# Patient Record
Sex: FEMALE | Race: WHITE | NOT HISPANIC OR LATINO | Employment: UNEMPLOYED | URBAN - METROPOLITAN AREA
[De-identification: names, ages, dates, MRNs, and addresses within clinical notes are randomized per-mention and may not be internally consistent; named-entity substitution may affect disease eponyms.]

---

## 2020-08-18 ENCOUNTER — APPOINTMENT (OUTPATIENT)
Dept: ULTRASOUND IMAGING | Facility: CLINIC | Age: 55
End: 2020-08-18
Attending: EMERGENCY MEDICINE
Payer: COMMERCIAL

## 2020-08-18 ENCOUNTER — NURSE TRIAGE (OUTPATIENT)
Dept: NURSING | Facility: CLINIC | Age: 55
End: 2020-08-18

## 2020-08-18 ENCOUNTER — HOSPITAL ENCOUNTER (EMERGENCY)
Facility: CLINIC | Age: 55
Discharge: HOME | End: 2020-08-18
Attending: EMERGENCY MEDICINE | Admitting: EMERGENCY MEDICINE
Payer: COMMERCIAL

## 2020-08-18 VITALS
HEART RATE: 101 BPM | TEMPERATURE: 97.9 F | RESPIRATION RATE: 18 BRPM | SYSTOLIC BLOOD PRESSURE: 144 MMHG | OXYGEN SATURATION: 96 % | DIASTOLIC BLOOD PRESSURE: 84 MMHG

## 2020-08-18 DIAGNOSIS — M79.662 PAIN OF LEFT LOWER LEG: ICD-10-CM

## 2020-08-18 DIAGNOSIS — R03.0 ELEVATED BLOOD PRESSURE READING WITHOUT DIAGNOSIS OF HYPERTENSION: ICD-10-CM

## 2020-08-18 LAB
ANION GAP SERPL CALCULATED.3IONS-SCNC: 8 MMOL/L (ref 3–14)
BASOPHILS # BLD AUTO: 0.1 10E9/L (ref 0–0.2)
BASOPHILS NFR BLD AUTO: 1 %
BUN SERPL-MCNC: 19 MG/DL (ref 7–30)
CALCIUM SERPL-MCNC: 9.2 MG/DL (ref 8.5–10.1)
CHLORIDE SERPL-SCNC: 109 MMOL/L (ref 94–109)
CK SERPL-CCNC: 105 U/L (ref 30–225)
CO2 SERPL-SCNC: 22 MMOL/L (ref 20–32)
CREAT SERPL-MCNC: 0.97 MG/DL (ref 0.52–1.04)
DIFFERENTIAL METHOD BLD: NORMAL
EOSINOPHIL # BLD AUTO: 0.2 10E9/L (ref 0–0.7)
EOSINOPHIL NFR BLD AUTO: 2.8 %
ERYTHROCYTE [DISTWIDTH] IN BLOOD BY AUTOMATED COUNT: 13.1 % (ref 10–15)
GFR SERPL CREATININE-BSD FRML MDRD: 65 ML/MIN/{1.73_M2}
GLUCOSE SERPL-MCNC: 89 MG/DL (ref 70–99)
HCT VFR BLD AUTO: 41.6 % (ref 35–47)
HGB BLD-MCNC: 13.9 G/DL (ref 11.7–15.7)
IMM GRANULOCYTES # BLD: 0 10E9/L (ref 0–0.4)
IMM GRANULOCYTES NFR BLD: 0.3 %
INR PPP: 0.93 (ref 0.86–1.14)
LYMPHOCYTES # BLD AUTO: 2.5 10E9/L (ref 0.8–5.3)
LYMPHOCYTES NFR BLD AUTO: 37.7 %
MCH RBC QN AUTO: 29.4 PG (ref 26.5–33)
MCHC RBC AUTO-ENTMCNC: 33.4 G/DL (ref 31.5–36.5)
MCV RBC AUTO: 88 FL (ref 78–100)
MONOCYTES # BLD AUTO: 0.6 10E9/L (ref 0–1.3)
MONOCYTES NFR BLD AUTO: 8.8 %
NEUTROPHILS # BLD AUTO: 3.3 10E9/L (ref 1.6–8.3)
NEUTROPHILS NFR BLD AUTO: 49.4 %
NRBC # BLD AUTO: 0 10*3/UL
NRBC BLD AUTO-RTO: 0 /100
PLATELET # BLD AUTO: 300 10E9/L (ref 150–450)
POTASSIUM SERPL-SCNC: 3.7 MMOL/L (ref 3.4–5.3)
RBC # BLD AUTO: 4.73 10E12/L (ref 3.8–5.2)
SODIUM SERPL-SCNC: 138 MMOL/L (ref 133–144)
WBC # BLD AUTO: 6.7 10E9/L (ref 4–11)

## 2020-08-18 PROCEDURE — 93971 EXTREMITY STUDY: CPT | Mod: LT

## 2020-08-18 PROCEDURE — 99284 EMERGENCY DEPT VISIT MOD MDM: CPT | Mod: 25

## 2020-08-18 PROCEDURE — 85025 COMPLETE CBC W/AUTO DIFF WBC: CPT | Performed by: EMERGENCY MEDICINE

## 2020-08-18 PROCEDURE — 85610 PROTHROMBIN TIME: CPT | Performed by: EMERGENCY MEDICINE

## 2020-08-18 PROCEDURE — 80048 BASIC METABOLIC PNL TOTAL CA: CPT | Performed by: EMERGENCY MEDICINE

## 2020-08-18 PROCEDURE — 82550 ASSAY OF CK (CPK): CPT | Performed by: EMERGENCY MEDICINE

## 2020-08-18 RX ORDER — LIDOCAINE 40 MG/G
CREAM TOPICAL
Status: DISCONTINUED | OUTPATIENT
Start: 2020-08-18 | End: 2020-08-18 | Stop reason: HOSPADM

## 2020-08-18 ASSESSMENT — ENCOUNTER SYMPTOMS
MYALGIAS: 1
WEAKNESS: 0
NERVOUS/ANXIOUS: 1

## 2020-08-18 NOTE — ED TRIAGE NOTES
Presents to the ED with left leg pain x 8 days. Denies known injury, but reports difficulty walking due to pain. Taking aspirin and ibuprofen with no relief in pain.

## 2020-08-18 NOTE — ED AVS SNAPSHOT
[facility] Emergency Department  [address]  [facility] [address]  Phone:  [phone]  Fax:  [phone]                                    Joyce Alford   MRN: [id number]    Department:  [facility] Emergency Department   Date of Visit:  8/18/2020           After Visit Summary Signature Page    I have received my discharge instructions, and my questions have been answered. I have discussed any challenges I see with this plan with the nurse or doctor.    ..........................................................................................................................................  Patient/Patient Representative Signature      ..........................................................................................................................................  Patient Representative Print Name and Relationship to Patient    ..................................................               ................................................  Date                                   Time    ..........................................................................................................................................  Reviewed by Signature/Title    ...................................................              ..............................................  Date                                               Time          22EPIC Rev 08/18

## 2020-08-18 NOTE — TELEPHONE ENCOUNTER
"Past 8 days/ has had left leg pain \"9-10/10\" \"I can hardly walk/ no fever or swelling/ she does have tenderness to touch in the calf area/more in the front/no injury. She will go to the er  Ketan Tse RN [phone]    Additional Information    Negative: Looks like a broken bone or dislocated joint (e.g., crooked or deformed)    Negative: Sounds like a life-threatening emergency to the triager    Negative: Followed a hip injury    Negative: Followed a knee injury    Negative: Followed an ankle or foot injury    Negative: Back pain radiating (shooting) into leg(s)    Negative: Foot pain is the main symptom    Negative: Ankle pain is the main symptom    Negative: Knee pain is the main symptom    Negative: Leg swelling is the main symptom    Negative: Chest pain    Negative: Difficulty breathing    Negative: Entire foot is cool or blue in comparison to other side    Negative: Unable to walk    Negative: Fever and red area (or area very tender to touch)    Negative: Fever and swollen joint    Thigh or calf pain in only one leg and present > 1 hour    Protocols used: LEG PAIN-A-OH      "

## 2020-08-18 NOTE — ED PROVIDER NOTES
History   Chief Complaint  Leg Pain    The history is provided by the patient.      Joyce Alford is a 54 year old female who presents for evaluation of 8 days of severe left leg pain. The pain is mainly in her left calf, but can at times be in her hip, thigh, or ankle. The whole leg at once can also be painful at times. The pain can be a dull ache at times, but can also be a sharp pain at times. She currently rates the pain as a 3/10, but at its worse it can be a 9/10 in severity. Walking exacerbates the pain, although she does notice it while lying down or sitting as well. She does not have any weakness in the area. She does not have any pain in her other leg, her arms, or her back. She denies any new activities or injuries. She does endorses having some anxiety currently, as this she does not typically see a doctor. She took some ibuprofen prior to coming today.     Cardiac/PE/DVT Risk Factors:  History of hypertension - no  History of hyperlipidemia - no  History of diabetes - no  History of smoking - no  Personal history of PE/DVT - no  Family history of PE/DVT - no  Family history of heart complications - no  Recent travel - no  Recent surgery - no  Other immobilizations - no  Cancer - no    Allergies  No Known Allergies    Medications  The patient is not currently taking any prescribed medications.    Past Medical History  The patient does not have any pertinent past medical history.    Past Surgical History   History reviewed.  No pertinent past surgical history.    Family History  History reviewed. No pertinent family history.    Social History  The patient was unaccompanied to the ED.    Review of Systems   Musculoskeletal: Positive for myalgias.   Neurological: Negative for weakness.   Psychiatric/Behavioral: The patient is nervous/anxious.    All other systems reviewed and are negative.      Physical Exam     Patient Vitals for the past 24 hrs:   BP Temp Pulse Resp SpO2   08/18/20 1300 (!) 141/83  -- 92 -- 96 %   08/18/20 1255 (!) 142/83 -- 93 -- 99 %   08/18/20 1140 (!) 151/86 97.9  F (36.6  C) 100 18 98 %       Physical Exam   General: The patient is alert, in no respiratory distress.    HENT: Mucous membranes moist.    Cardiovascular: Regular rate and rhythm. Good pulses in all four extremities. Normal capillary refill and skin turgor.     Respiratory: Lungs are clear. No nasal flaring. No retractions. No wheezing, no crackles.    Gastrointestinal: Abdomen soft. No guarding, no rebound. No palpable hernias.     Musculoskeletal: No pain with ROM of the joints of her left leg. No joint swelling or erythema. The only tenderness to palpation is on left pre tibial region, which looks and feels normal. No gross deformity.     Skin: No rashes or petechiae.     Neurologic: The patient is alert and oriented x3. GCS 15. No testable cranial nerve deficit. Follows commands with clear and appropriate speech. Gives appropriate answers. Good strength in all extremities. No gross neurologic deficit. Gross sensation intact. Pupils are round and reactive. No meningismus.     Lymphatic: No cervical adenopathy. No lower extremity swelling.    Psychiatric: The patient is non-tearful.      Emergency Department Course   Imaging:  Radiology findings were communicated with the patient who voiced understanding of the findings.    Us lower extremity venous duplex left:  IMPRESSION:   1.  No deep venous thrombosis in the left lower extremity.   Readings per Radiology    Laboratory:  Laboratory findings were communicated with the patient who voiced understanding of the findings.    CK total: 105  INR: 0.93  BMP: AWNL (Creatinine 0.97)  CBC: AWNL (WBC 6.7, HGB 13.9, )    Emergency Department Course:  Past medical records, nursing notes, and vitals reviewed.    1204 I physically examined the patient as documented above.    IV was inserted and blood was drawn for laboratory testing, results above.    The patient was sent for  radiographs while in the emergency department, results above.     1324 I rechecked the patient and discussed the findings of their workup thus far.     Findings and plan explained to the Patient. Patient discharged home with instructions regarding supportive care, medications, and reasons to return. The importance of close follow-up was reviewed.     I personally reviewed the laboratory and imaging results with the Patient and answered all related questions prior to discharge.     Impression & Plan   Medical Decision Making:  The patient presents complaining of pain in her left leg that limits her walking however it does not follow any typical pattern and the leg appearance looks normal.  The patient reports he does not follow-up with a doctor and her exact medical history is unknown but by her screening laboratory studies here other than her blood pressure I could not find a major abnormality.  I did consider with the pain patient having tenderness over the left leg this could be something musculoskeletal however she did say that sometimes she has discrete areas that are not contiguous that hurt such as her left hip dorsum of her left thigh but never any other extremity.  Does not appear to radiate will be shooting from the back and therefore a herniated disc is unlikely.  She had a negative straight leg raise sign here.  The legs showed no swelling there was no erythema or swelling of the joints.  I considered neuropathy however diabetic neuropathy would be unlikely only the left leg.  There are immune or inflammatory conditions or trauma that could be behind this.  DVT could cause pain therefore an ultrasound for which was negative.  The patient is aware she will need to follow-up regarding her elevated blood pressure for repeat ultrasound and further testing.  The patient will use scheduled ibuprofen the meantime I did not find any life-threatening condition currently.  And she was discharged to close follow-up.   I do not think that there is a deeper tissue infection or De La Rosa significant pathologic process.  X-rays were not performed due to lack of trauma however it persists that may need to be examined to look for occult fractures and/or lytic lesions.    Diagnosis:    ICD-10-CM    1. Pain of left lower leg  M79.662    2. Elevated blood pressure reading without diagnosis of hypertension  R03.0      Disposition:  Discharged to home.    Discharge Medications:  New Prescriptions    No medications on file       Scribe Disclosure:  I, Rhina Guardado, am serving as a scribe at 12:04 PM on 8/18/2020 to document services personally performed by Alan Louie MD based on my observations and the provider's statements to me.      Alan Louie MD  08/18/20 0136

## 2020-09-09 ENCOUNTER — RECORDS - HEALTHEAST (OUTPATIENT)
Dept: ADMINISTRATIVE | Facility: OTHER | Age: 55
End: 2020-09-09

## 2020-09-16 ENCOUNTER — RECORDS - HEALTHEAST (OUTPATIENT)
Dept: ADMINISTRATIVE | Facility: OTHER | Age: 55
End: 2020-09-16

## 2020-09-16 ENCOUNTER — RECORDS - HEALTHEAST (OUTPATIENT)
Dept: LAB | Facility: CLINIC | Age: 55
End: 2020-09-16

## 2020-09-16 ENCOUNTER — RECORDS - HEALTHEAST (OUTPATIENT)
Dept: BONE DENSITY | Facility: CLINIC | Age: 55
End: 2020-09-16

## 2020-09-16 DIAGNOSIS — M84.362S: ICD-10-CM

## 2020-09-16 DIAGNOSIS — Z13.820 ENCOUNTER FOR SCREENING FOR OSTEOPOROSIS: ICD-10-CM

## 2020-09-16 LAB — 25(OH)D3 SERPL-MCNC: 24.8 NG/ML (ref 30–80)

## 2023-04-27 ENCOUNTER — OFFICE VISIT (OUTPATIENT)
Dept: INTERNAL MEDICINE | Facility: CLINIC | Age: 58
End: 2023-04-27
Payer: COMMERCIAL

## 2023-04-27 VITALS
HEART RATE: 78 BPM | OXYGEN SATURATION: 100 % | TEMPERATURE: 98.9 F | DIASTOLIC BLOOD PRESSURE: 58 MMHG | SYSTOLIC BLOOD PRESSURE: 112 MMHG | WEIGHT: 122.7 LBS

## 2023-04-27 DIAGNOSIS — Z00.00 ROUTINE GENERAL MEDICAL EXAMINATION AT A HEALTH CARE FACILITY: Primary | ICD-10-CM

## 2023-04-27 DIAGNOSIS — Z12.11 SCREENING FOR COLON CANCER: ICD-10-CM

## 2023-04-27 DIAGNOSIS — N39.41 URGE INCONTINENCE OF URINE: ICD-10-CM

## 2023-04-27 DIAGNOSIS — Z12.31 ENCOUNTER FOR SCREENING MAMMOGRAM FOR BREAST CANCER: ICD-10-CM

## 2023-04-27 DIAGNOSIS — F33.1 MAJOR DEPRESSIVE DISORDER, RECURRENT EPISODE, MODERATE (H): ICD-10-CM

## 2023-04-27 PROCEDURE — 99386 PREV VISIT NEW AGE 40-64: CPT | Performed by: INTERNAL MEDICINE

## 2023-04-27 PROCEDURE — 99204 OFFICE O/P NEW MOD 45 MIN: CPT | Mod: 25 | Performed by: INTERNAL MEDICINE

## 2023-04-27 RX ORDER — OXYBUTYNIN CHLORIDE 5 MG/1
5 TABLET ORAL 2 TIMES DAILY
COMMUNITY
End: 2023-09-19

## 2023-04-27 RX ORDER — ESCITALOPRAM OXALATE 10 MG/1
TABLET ORAL
Qty: 87 TABLET | Refills: 0 | Status: SHIPPED | OUTPATIENT
Start: 2023-04-27 | End: 2023-08-23

## 2023-04-27 ASSESSMENT — ENCOUNTER SYMPTOMS
PALPITATIONS: 0
HEMATURIA: 0
NERVOUS/ANXIOUS: 0
SORE THROAT: 0
SHORTNESS OF BREATH: 0
DIARRHEA: 0
ARTHRALGIAS: 1
MYALGIAS: 0
CHILLS: 0
JOINT SWELLING: 0
HEADACHES: 0
HEMATOCHEZIA: 0
ABDOMINAL PAIN: 0
PARESTHESIAS: 0
NAUSEA: 0
COUGH: 0
BREAST MASS: 0
WEAKNESS: 0
FREQUENCY: 1
DIZZINESS: 0
CONSTIPATION: 0
HEARTBURN: 0
FEVER: 0
EYE PAIN: 0
DYSURIA: 0

## 2023-04-27 ASSESSMENT — PATIENT HEALTH QUESTIONNAIRE - PHQ9
10. IF YOU CHECKED OFF ANY PROBLEMS, HOW DIFFICULT HAVE THESE PROBLEMS MADE IT FOR YOU TO DO YOUR WORK, TAKE CARE OF THINGS AT HOME, OR GET ALONG WITH OTHER PEOPLE: EXTREMELY DIFFICULT
SUM OF ALL RESPONSES TO PHQ QUESTIONS 1-9: 18
SUM OF ALL RESPONSES TO PHQ QUESTIONS 1-9: 18

## 2023-04-27 NOTE — PROGRESS NOTES
SUBJECTIVE:   CC: Joyce is an 57 year old who presents for preventive health visit.   Patient has been advised of split billing requirements and indicates understanding: Yes  Healthy Habits:     Getting at least 3 servings of Calcium per day:  Yes    Bi-annual eye exam:  Yes    Dental care twice a year:  NO    Sleep apnea or symptoms of sleep apnea:  Daytime drowsiness    Diet:  Regular (no restrictions)    Frequency of exercise:  6-7 days/week    Duration of exercise:  30-45 minutes    Taking medications regularly:  Yes    Medication side effects:  None    PHQ-2 Total Score: 6    Additional concerns today:  No    Joyce presents today for a physical exam. This is the first time I have met Joyce. We also discussed her mood. Reports history of depression years ago. Was treated with medication but she's not sure what it was. She saw a cardiologist 2 days ago and is in the middle of a cardiac work-up for some L-sided rib/chest pain. She tells me she has not had good insurance for awhile and has not seen a physician in some time.    Today's PHQ-2 Score:       4/27/2023     1:10 PM   PHQ-2 ( 1999 [employer])   Q1: Little interest or pleasure in doing things 3   Q2: Feeling down, depressed or hopeless 3   PHQ-2 Score 6   Q1: Little interest or pleasure in doing things Nearly every day    Nearly every day   Q2: Feeling down, depressed or hopeless Nearly every day    Nearly every day   PHQ-2 Score 6    6     Have you ever done Advance Care Planning? (For example, a Health Directive, POLST, or a discussion with a medical provider or your loved ones about your wishes): No, advance care planning information given to patient to review.  Patient plans to discuss their wishes with loved ones or provider.      Social History     Tobacco Use     Smoking status: Never     Smokeless tobacco: Never   Vaping Use     Vaping status: Never Used   Substance Use Topics     Alcohol use: Yes     Comment: weekly         4/27/2023     1:10 PM    Alcohol Use   Prescreen: >3 drinks/day or >7 drinks/week? No     Reviewed orders with patient.  Reviewed health maintenance and updated orders accordingly - Yes    Labs reviewed in EPIC    Breast Cancer Screenin/27/2023     1:11 PM   Breast CA Risk Assessment (FHS-7)   Do you have a family history of breast, colon, or ovarian cancer? No / Unknown     Mammogram Screening: Recommended mammography every 1-2 years with patient discussion and risk factor consideration  Pertinent mammograms are reviewed under the imaging tab.    History of abnormal Pap smear: NO - age 30-65 PAP every 5 years with negative HPV co-testing recommended     Reviewed and updated as needed this visit by clinical staff   Tobacco  Allergies  Meds  Problems  Med Hx  Surg Hx  Fam Hx        Reviewed and updated as needed this visit by Provider   Tobacco  Allergies  Meds  Problems  Med Hx  Surg Hx  Fam Hx         Review of Systems   Constitutional: Negative for chills and fever.   HENT: Negative for congestion, ear pain, hearing loss and sore throat.    Eyes: Negative for pain and visual disturbance.   Respiratory: Negative for cough and shortness of breath.    Cardiovascular: Negative for chest pain, palpitations and peripheral edema.   Gastrointestinal: Negative for abdominal pain, constipation, diarrhea, heartburn, hematochezia and nausea.   Breasts:  Negative for tenderness, breast mass and discharge.   Genitourinary: Positive for frequency. Negative for dysuria, genital sores, hematuria, pelvic pain, urgency, vaginal bleeding and vaginal discharge.   Musculoskeletal: Positive for arthralgias. Negative for joint swelling and myalgias.   Skin: Negative for rash.   Neurological: Negative for dizziness, weakness, headaches and paresthesias.   Psychiatric/Behavioral: Negative for mood changes. The patient is not nervous/anxious.      OBJECTIVE:   /58   Pulse 78   Temp 98.9  F (37.2  C) (Tympanic)   Wt 55.7 kg (122 lb  11.2 oz)   SpO2 100%      Physical Exam  GENERAL: In no distress.  EYES: Conjunctivae/corneas clear. EOMs grossly intact.  HENT: NC/AT, facies symmetric. Neck supple. No LAD or thyromegaly noted.  RESP: CTAB. No w/r/r.  CV: RRR, no m/r/g.  GI: NT, ND, without rebound or guarding, no CVA tenderness, no hepatomegaly appreciated.  MSK: Moves all four extremities freely.  SKIN: No significant ulcers, lesions or rashes on the visualized portions of the skin  NEURO: Alert. Oriented.  PSYCH: Linear thought process. Speech normal rate and volume. No tangential thoughts, hallucinations, or delusions.    Diagnostic Test Results: Labs reviewed in Epic    ASSESSMENT/PLAN:   Routine general medical examination at a health care facility  Reviewed PMH. Discussed healthcare maintenance issues, including cancer screenings (which she seemed largely against but was open to the below screenings being ordered at least), relevant immunizations (she declined COVID shot today and told me 'I don't believe in that stuff', I encouraged her to obtain Shingrix through a pharmacy which she surprisingly was open to doing), and cardiac risk factor screenings such as for cholesterol, HTN, and DM (I reviewed recent lipid panel and BMP from outside cardiology clinic in [facility]).    Major depressive disorder, recurrent episode, moderate (H)  She would like to start anti-depressant. We discussed starting selective serotonin reuptake inhibitor therapy, will start escitalopram. Discussed possible side effects such as diarrhea, sexual dysfunction, and mood irritability. Discussed that these medications need to be taken every day to work and that they can take 4-6 weeks to fully kick in. F/u in 4-6 weeks via [employer] message for mood check.  - escitalopram (LEXAPRO) 10 MG tablet; Take 0.5 tablets (5 mg) by mouth daily for 6 days, THEN 1 tablet (10 mg) daily for 84 days.    Urge incontinence of urine  On oxybutynin. Managed by an outside  provider.    Encounter for screening mammogram for breast cancer  She is not sure she'd like to proceed with another mammogram as she's concerned about her implants being damaged (though she also tells me she's aware that mammograms won't damage them since she's had a mammogram before). Order placed in case she's interested in pursuing.  - MA Screen with Implants Bilateral w/Yahir; Future    Screening for colon cancer  Has never been screened before. She declines colonoscopy but was open to FIT. Order placed.    COUNSELING:  Reviewed preventive health counseling, as reflected in patient instructions    She reports that she has never smoked. She has never used smokeless tobacco.    Yoan Raymond MD  [facility]    Answers for HPI/ROS submitted by the patient on 4/27/2023  If you checked off any problems, how difficult have these problems made it for you to do your work, take care of things at home, or get along with other people?: Extremely difficult  PHQ9 TOTAL SCORE: 18

## 2023-04-27 NOTE — PATIENT INSTRUCTIONS
- Make an appointment with our pharmacy downstairs or stop by your preferred pharmacy to discuss obtaining the Shingrix (shingles) vaccine series    - Call [phone] to schedule your mammogram with our centralized imaging schedulers

## 2023-05-01 ENCOUNTER — TRANSFERRED RECORDS (OUTPATIENT)
Dept: MULTI SPECIALTY CLINIC | Facility: CLINIC | Age: 58
End: 2023-05-01

## 2023-05-01 LAB — PAP SMEAR - HIM PATIENT REPORTED: NORMAL

## 2023-06-05 ENCOUNTER — TELEPHONE (OUTPATIENT)
Dept: INTERNAL MEDICINE | Facility: CLINIC | Age: 58
End: 2023-06-05
Payer: COMMERCIAL

## 2023-06-05 NOTE — TELEPHONE ENCOUNTER
Pt called the clinic stating she was prescribed escitalopram by Dr Rodrigues at the last OV.     Pt stated she is taking 1 pill/day. Pt stated she hasnt noticed a difference and would like to try something else. Pt stated her symptoms are the same as when she saw Dr Rodrigues.     Pt denies SI.     Preferred pharmacy- [facility] on [address] in [place].     Routing to provider to review and advise.

## 2023-06-07 ENCOUNTER — VIRTUAL VISIT (OUTPATIENT)
Dept: INTERNAL MEDICINE | Facility: CLINIC | Age: 58
End: 2023-06-07
Payer: COMMERCIAL

## 2023-06-07 DIAGNOSIS — F33.1 MAJOR DEPRESSIVE DISORDER, RECURRENT EPISODE, MODERATE (H): Primary | ICD-10-CM

## 2023-06-07 PROCEDURE — 99441 PR PHYSICIAN TELEPHONE EVALUATION 5-10 MIN: CPT | Mod: 95 | Performed by: INTERNAL MEDICINE

## 2023-06-07 NOTE — PROGRESS NOTES
Joyce is a 57 year old who is being evaluated via a billable telephone visit.      What phone number would you like to be contacted at? [phone]  How would you like to obtain your AVS? Mail a copy  Distant Location (provider location):  Off-site    Assessment & Plan   Major depressive disorder, recurrent episode, moderate (H)  Discussed how to wean off Lexapro and onto sertraline. Discussed that failure of one selective serotonin reuptake inhibitor does not necessarily mean all selective serotonin reuptake inhibitor medications will fail, and recommendation is to try another selective serotonin reuptake inhibitor. She was in agreement. Again discussed possible side effects such as dry mouth, sexual dysfunction, and mood irritability. Discussed that these medications need to be taken every day to work and that they can take 4-6 weeks to fully kick in. F/u in 4-6 weeks via [employer] message for mood check.  - sertraline (ZOLOFT) 50 MG tablet; Take 0.5 tablets (25 mg) by mouth daily for 6 days, THEN 1 tablet (50 mg) daily for 84 days.    Yoan Raymond MD  [facility]   Joyce is a 57 year old who presents for a same day acute care virtual telephone visit with chief concern of:  MOOD CHANGES and Mental Health Problem  I first met Joyce ~6 weeks ago.    HPI   Patient wants to discuss changing medication as since patient started on the 10mg escitalopram it has not changed anything since her last visit. She wants to switch meds. She was on anti-depressants in the past but that was 20+ years ago and she doesn't remember the name of the meds. Feels a bit numb on the escitalopram. Doesn't feel like it's helping with depression.    Review of Systems   Psych system negative, except as otherwise noted.      Objective       Vitals: No vitals were obtained today due to virtual visit.    Physical Exam   healthy, alert and no distress  PSYCH: Alert and oriented times 3; coherent  speech, normal rate and volume, able to articulate logical thoughts, able to abstract reason, no tangential thoughts, no hallucinations or delusions. Her affect is normal  RESP: No cough, no audible wheezing, able to talk in full sentences  Remainder of exam unable to be completed due to telephone visits    Phone call duration: 6 minutes

## 2023-06-20 ENCOUNTER — TELEPHONE (OUTPATIENT)
Dept: INTERNAL MEDICINE | Facility: CLINIC | Age: 58
End: 2023-06-20
Payer: COMMERCIAL

## 2023-06-20 NOTE — TELEPHONE ENCOUNTER
Reason for Call:  Appointment Request    Patient requesting this type of appt:  Ear Flushing    Requested provider: anyone in [place]    Reason patient unable to be scheduled: Not within requested timeframe    When does patient want to be seen/preferred time: 1-2 days    Comments: Patients left ear is plugged and needs to be flushed, unable to hear out of it.. no appointments till July in surrounding areas     Okay to leave a detailed message?: Yes at Home number on file [phone] (home)    Call taken on 6/20/2023 at 12:02 PM by Cony Stone

## 2023-06-23 ENCOUNTER — OFFICE VISIT (OUTPATIENT)
Dept: FAMILY MEDICINE | Facility: CLINIC | Age: 58
End: 2023-06-23
Payer: COMMERCIAL

## 2023-06-23 VITALS
BODY MASS INDEX: 19.83 KG/M2 | DIASTOLIC BLOOD PRESSURE: 69 MMHG | SYSTOLIC BLOOD PRESSURE: 129 MMHG | WEIGHT: 119 LBS | RESPIRATION RATE: 16 BRPM | TEMPERATURE: 98 F | HEART RATE: 83 BPM | OXYGEN SATURATION: 98 % | HEIGHT: 65 IN

## 2023-06-23 DIAGNOSIS — H90.0 CONDUCTIVE HEARING LOSS, BILATERAL: ICD-10-CM

## 2023-06-23 DIAGNOSIS — H61.23 BILATERAL IMPACTED CERUMEN: Primary | ICD-10-CM

## 2023-06-23 PROCEDURE — 69209 REMOVE IMPACTED EAR WAX UNI: CPT

## 2023-06-23 PROCEDURE — 99213 OFFICE O/P EST LOW 20 MIN: CPT | Mod: 25

## 2023-06-23 ASSESSMENT — ANXIETY QUESTIONNAIRES
4. TROUBLE RELAXING: NOT AT ALL
2. NOT BEING ABLE TO STOP OR CONTROL WORRYING: NOT AT ALL
GAD7 TOTAL SCORE: 0
5. BEING SO RESTLESS THAT IT IS HARD TO SIT STILL: NOT AT ALL
8. IF YOU CHECKED OFF ANY PROBLEMS, HOW DIFFICULT HAVE THESE MADE IT FOR YOU TO DO YOUR WORK, TAKE CARE OF THINGS AT HOME, OR GET ALONG WITH OTHER PEOPLE?: VERY DIFFICULT
GAD7 TOTAL SCORE: 0
GAD7 TOTAL SCORE: 0
7. FEELING AFRAID AS IF SOMETHING AWFUL MIGHT HAPPEN: NOT AT ALL
7. FEELING AFRAID AS IF SOMETHING AWFUL MIGHT HAPPEN: NOT AT ALL
IF YOU CHECKED OFF ANY PROBLEMS ON THIS QUESTIONNAIRE, HOW DIFFICULT HAVE THESE PROBLEMS MADE IT FOR YOU TO DO YOUR WORK, TAKE CARE OF THINGS AT HOME, OR GET ALONG WITH OTHER PEOPLE: VERY DIFFICULT
1. FEELING NERVOUS, ANXIOUS, OR ON EDGE: NOT AT ALL
3. WORRYING TOO MUCH ABOUT DIFFERENT THINGS: NOT AT ALL
6. BECOMING EASILY ANNOYED OR IRRITABLE: NOT AT ALL

## 2023-06-23 ASSESSMENT — PATIENT HEALTH QUESTIONNAIRE - PHQ9
SUM OF ALL RESPONSES TO PHQ QUESTIONS 1-9: 11
10. IF YOU CHECKED OFF ANY PROBLEMS, HOW DIFFICULT HAVE THESE PROBLEMS MADE IT FOR YOU TO DO YOUR WORK, TAKE CARE OF THINGS AT HOME, OR GET ALONG WITH OTHER PEOPLE: EXTREMELY DIFFICULT
SUM OF ALL RESPONSES TO PHQ QUESTIONS 1-9: 11

## 2023-06-23 ASSESSMENT — PAIN SCALES - GENERAL: PAINLEVEL: MILD PAIN (2)

## 2023-06-23 NOTE — PROGRESS NOTES
Assessment & Plan     (H61.23) Bilateral impacted cerumen  (primary encounter diagnosis)  (H90.0) Conductive hearing loss, bilateral  Comment: hearing loss d/t cerumen impaction. Ear exam following irrigation normal, no infection concerns however patient does have some skin irritation in the left ear canal. Discussed monitoring this and to notify provider if having increased pain left ear and to have reevaluated. Hearing much improved otherwise. Discussed Debrox ear drops OTC to help maintain and prevent cerumen impaction. Patient fully understands and is agreeable with plan of care, at this point patient will follow up as needed unless acute concerns arise in the meantime.  Plan: [place] REMOVAL IMPACTED CERUMEN IRRIGATION/LVG         MANSOOR Haynes CNP  [facility]    Tavares Cook is a 57 year old, presenting for the following health issues:  Ear Problem        6/23/2023    10:06 AM   Additional Questions   Roomed by ELSIE DOZIER   Accompanied by self         6/23/2023    10:06 AM   Patient Reported Additional Medications   Patient reports taking the following new medications na     History of Present Illness       Reason for visit:  Earache  Symptom onset:  1-2 weeks ago  Symptoms include:  Cannot hear in left ear  Symptom intensity:  Severe  Symptom progression:  Staying the same  Had these symptoms before:  No  What makes it worse:  Na  What makes it better:  Na    She eats 0-1 servings of fruits and vegetables daily.She consumes 0 sweetened beverage(s) daily.She exercises with enough effort to increase her heart rate 20 to 29 minutes per day.  She exercises with enough effort to increase her heart rate 7 days per week.   She is taking medications regularly.    Today's PHQ-9         PHQ-9 Total Score: 11    PHQ-9 Q9 Thoughts of better off dead/self-harm past 2 weeks :   Not at all    How difficult have these problems made it for you to do your work, take care of things  at home, or get along with other people: Extremely difficult  Today's MARI-7 Score: 0     States she is experiencing no pain, feels like it is just wax. Consulted with the Internet and tried a few home remedies (I.e. hydrogen peroxide) with no relief       Has some tinnitus with this  No fever chills, myalgias     Review of Systems   Constitutional, HEENT systems are negative, except as otherwise noted.      Objective    There were no vitals taken for this visit.  There is no height or weight on file to calculate BMI.  Physical Exam  Vitals and nursing note reviewed.   Constitutional:       General: She is not in acute distress.     Appearance: Normal appearance. She is not ill-appearing.   HENT:      Right Ear: Tympanic membrane, ear canal and external ear normal. There is impacted cerumen.      Left Ear: Tympanic membrane, ear canal and external ear normal. There is impacted cerumen.      Ears:      Comments: Following ear lavage TM pearly gray, cone of light noted, bony structures visible. No infection noted.     Cardiovascular:      Rate and Rhythm: Normal rate.   Pulmonary:      Effort: Pulmonary effort is normal.   Skin:     General: Skin is warm and dry.   Neurological:      Mental Status: She is alert.   Psychiatric:         Mood and Affect: Mood normal.         Behavior: Behavior normal.         Thought Content: Thought content normal.         Judgment: Judgment normal.

## 2023-06-23 NOTE — PATIENT INSTRUCTIONS
Debrox (carbamide peroxide) ear drops to help maintain/prevent ear wax build up.  -OTC kit comes w/ bulb syringe.

## 2023-07-13 ENCOUNTER — ANCILLARY PROCEDURE (OUTPATIENT)
Dept: MAMMOGRAPHY | Facility: CLINIC | Age: 58
End: 2023-07-13
Attending: INTERNAL MEDICINE
Payer: COMMERCIAL

## 2023-07-13 PROCEDURE — 77063 BREAST TOMOSYNTHESIS BI: CPT | Mod: TC | Performed by: RADIOLOGY

## 2023-07-13 PROCEDURE — 77067 SCR MAMMO BI INCL CAD: CPT | Mod: TC | Performed by: RADIOLOGY

## 2023-08-21 ENCOUNTER — TELEPHONE (OUTPATIENT)
Dept: INTERNAL MEDICINE | Facility: CLINIC | Age: 58
End: 2023-08-21
Payer: COMMERCIAL

## 2023-08-21 NOTE — TELEPHONE ENCOUNTER
Pt calling about the zoloft she recently started and had a couple of questions. Such as if she should continue using them or switching to an alternative. Please call her back or ask her to set up an appt.

## 2023-08-23 ENCOUNTER — VIRTUAL VISIT (OUTPATIENT)
Dept: INTERNAL MEDICINE | Facility: CLINIC | Age: 58
End: 2023-08-23
Payer: COMMERCIAL

## 2023-08-23 DIAGNOSIS — F33.1 MAJOR DEPRESSIVE DISORDER, RECURRENT EPISODE, MODERATE (H): Primary | ICD-10-CM

## 2023-08-23 PROCEDURE — 99213 OFFICE O/P EST LOW 20 MIN: CPT | Mod: VID | Performed by: INTERNAL MEDICINE

## 2023-08-23 RX ORDER — SERTRALINE HYDROCHLORIDE 100 MG/1
100 TABLET, FILM COATED ORAL DAILY
Qty: 90 TABLET | Refills: 1 | Status: SHIPPED | OUTPATIENT
Start: 2023-08-23 | End: 2023-09-19

## 2023-08-23 ASSESSMENT — PATIENT HEALTH QUESTIONNAIRE - PHQ9
SUM OF ALL RESPONSES TO PHQ QUESTIONS 1-9: 12
SUM OF ALL RESPONSES TO PHQ QUESTIONS 1-9: 12
10. IF YOU CHECKED OFF ANY PROBLEMS, HOW DIFFICULT HAVE THESE PROBLEMS MADE IT FOR YOU TO DO YOUR WORK, TAKE CARE OF THINGS AT HOME, OR GET ALONG WITH OTHER PEOPLE: SOMEWHAT DIFFICULT

## 2023-08-23 NOTE — PROGRESS NOTES
Joyce is a 57 year old who is being evaluated via a billable video visit.      How would you like to obtain your AVS? [employer]  If the video visit is dropped, the invitation should be resent by: Text to cell phone: [phone]  Will anyone else be joining your video visit? No    Assessment & Plan   Major depressive disorder, recurrent episode, moderate (H)  Discussed that if 50mg daily (which is a low dose of sertraline) is helping but just not as much as she'd like, recommendation would be to INCREASE to 100mg daily. She was in agreement. New script sent in. If this does not help, consider [facility] referral in future. She will reach out via [employer] with update in ~4 weeks.  - sertraline (ZOLOFT) 100 MG tablet; Take 1 tablet (100 mg) by mouth daily    Yoan Raymond MD  [facility]   Joyce is a 57 year old who presents for a next day acute care virtual video visit with chief concern of:  Depression and Anxiety  Did not feel escitalopram helped. Switched to sertraline ~2.5 months ago and feel it's working a bit better. Not quite helping as much as she'd like.    Patient Health Questionnaire (Submitted on 8/23/2023)  If you checked off any problems, how difficult have these problems made it for you to do your work, take care of things at home, or get along with other people?: Somewhat difficult  PHQ9 TOTAL SCORE: 12    Review of Systems   Psych system negative, except as otherwise noted.      Objective       Vitals: No vitals were obtained today due to virtual visit.    Physical Exam   GENERAL: Healthy, alert and no distress  EYES: Eyes grossly normal to inspection.  No discharge or erythema, or obvious scleral/conjunctival abnormalities.  RESP: No audible wheeze, cough, or visible cyanosis.  No visible retractions or increased work of breathing.    SKIN: Visible skin clear. No significant rash, abnormal pigmentation or lesions.  NEURO: Cranial nerves grossly intact.   Mentation and speech appropriate for age.  PSYCH: Mentation appears normal, affect normal/bright, judgement and insight intact, normal speech and appearance well-groomed.    Video-Visit Detail  Type of service: Video Visit   Video Start Time: 1:37 PM  Video End Time: 1:41 PM  Originating Location (pt. Location): Home  Distant Location (provider location):  On-site  Platform used for Video Visit: [employer]er[employer]

## 2023-09-19 ENCOUNTER — VIRTUAL VISIT (OUTPATIENT)
Dept: INTERNAL MEDICINE | Facility: CLINIC | Age: 58
End: 2023-09-19
Payer: COMMERCIAL

## 2023-09-19 DIAGNOSIS — N39.41 URGE INCONTINENCE OF URINE: ICD-10-CM

## 2023-09-19 DIAGNOSIS — F33.1 MAJOR DEPRESSIVE DISORDER, RECURRENT EPISODE, MODERATE (H): Primary | ICD-10-CM

## 2023-09-19 PROCEDURE — 99214 OFFICE O/P EST MOD 30 MIN: CPT | Mod: VID | Performed by: INTERNAL MEDICINE

## 2023-09-19 RX ORDER — OXYBUTYNIN CHLORIDE 5 MG/1
5 TABLET, EXTENDED RELEASE ORAL DAILY
Qty: 90 TABLET | Refills: 4 | Status: SHIPPED | OUTPATIENT
Start: 2023-09-19

## 2023-09-19 RX ORDER — SERTRALINE HYDROCHLORIDE 100 MG/1
200 TABLET, FILM COATED ORAL DAILY
Qty: 180 TABLET | Refills: 4 | Status: SHIPPED | OUTPATIENT
Start: 2023-09-19

## 2023-09-19 NOTE — PROGRESS NOTES
Joyce is a 58 year old who is being evaluated via a billable video visit.      How would you like to obtain your AVS? MyChart  If the video visit is dropped, the invitation should be resent by: Text to cell phone: [phone]  Will anyone else be joining your video visit? No    Assessment & Plan   Major depressive disorder, recurrent episode, moderate (H)  Patient would like to increase dose to 200mg daily and asked for year's worth of meds. Script sent. Encouraged her to f/u or reach out if this change is not working/adequate.  - sertraline (ZOLOFT) 100 MG tablet; Take 2 tablets (200 mg) by mouth daily    Urge incontinence of urine  Started by urologist. Reports this is working well. Refilled chronic medication at current dose.  - oxyBUTYnin ER (DITROPAN XL) 5 MG 24 hr tablet; Take 1 tablet (5 mg) by mouth daily    Yoan Raymond MD  [facility]   Joyce is a 58 year old who presents for a virtual video follow-up visit with chief concern of:  Depression    HPI   Depression Followup  How are you doing with your depression since your last visit? Improved But not much   Are you having other symptoms that might be associated with depression? No  Have you had a significant life event?  No   Are you feeling anxious or having panic attacks?   No  Do you have any concerns with your use of alcohol or other drugs? No    Social History     Tobacco Use    Smoking status: Never    Smokeless tobacco: Never   Vaping Use    Vaping Use: Never used   Substance Use Topics    Alcohol use: Yes     Comment: weekly    Drug use: Not Currently         4/27/2023     1:10 PM 6/23/2023    10:07 AM 8/23/2023    10:41 AM   PHQ   PHQ-9 Total Score 18 11 12   Q9: Thoughts of better off dead/self-harm past 2 weeks Not at all Not at all Not at all         6/23/2023    10:08 AM   MARI-7 SCORE   Total Score 0 (minimal anxiety)   Total Score 0         Review of Systems   Psych, gu systems are negative,  except as otherwise noted.      Objective       Vitals: No vitals were obtained today due to virtual visit.    Physical Exam   GENERAL: Healthy, alert and no distress  EYES: Eyes grossly normal to inspection.  No discharge or erythema, or obvious scleral/conjunctival abnormalities.  RESP: No audible wheeze, cough, or visible cyanosis.  No visible retractions or increased work of breathing.    SKIN: Visible skin clear. No significant rash, abnormal pigmentation or lesions.  NEURO: Cranial nerves grossly intact.  Mentation and speech appropriate for age.  PSYCH: Mentation appears normal, affect normal/bright, judgement and insight intact, normal speech and appearance well-groomed.    Video-Visit Details  Type of service: Video Visit   Video Start Time: 1:28 PM  Video End Time: 1:31 PM  Originating Location (pt. Location): Home  Distant Location (provider location):  On-site  Platform used for Video Visit: Sef[employer]ino

## 2023-09-21 ENCOUNTER — LAB (OUTPATIENT)
Dept: LAB | Facility: CLINIC | Age: 58
End: 2023-09-21
Payer: COMMERCIAL

## 2023-09-21 PROCEDURE — 82274 ASSAY TEST FOR BLOOD FECAL: CPT | Performed by: INTERNAL MEDICINE

## 2023-09-24 LAB — HEMOCCULT STL QL IA: NEGATIVE

## 2023-09-28 ENCOUNTER — OFFICE VISIT (OUTPATIENT)
Dept: OBGYN | Facility: CLINIC | Age: 58
End: 2023-09-28
Payer: COMMERCIAL

## 2023-09-28 VITALS — WEIGHT: 117 LBS | DIASTOLIC BLOOD PRESSURE: 72 MMHG | BODY MASS INDEX: 19.77 KG/M2 | SYSTOLIC BLOOD PRESSURE: 106 MMHG

## 2023-09-28 DIAGNOSIS — E78.00 ELEVATED CHOLESTEROL: Primary | ICD-10-CM

## 2023-09-28 DIAGNOSIS — Z01.419 ENCOUNTER FOR GYNECOLOGICAL EXAMINATION WITHOUT ABNORMAL FINDING: ICD-10-CM

## 2023-09-28 DIAGNOSIS — Z12.4 SCREENING FOR MALIGNANT NEOPLASM OF CERVIX: ICD-10-CM

## 2023-09-28 PROCEDURE — G0123 SCREEN CERV/VAG THIN LAYER: HCPCS | Performed by: OBSTETRICS & GYNECOLOGY

## 2023-09-28 PROCEDURE — 99213 OFFICE O/P EST LOW 20 MIN: CPT | Mod: 25 | Performed by: OBSTETRICS & GYNECOLOGY

## 2023-09-28 PROCEDURE — 87624 HPV HI-RISK TYP POOLED RSLT: CPT | Performed by: OBSTETRICS & GYNECOLOGY

## 2023-09-28 PROCEDURE — 99386 PREV VISIT NEW AGE 40-64: CPT | Performed by: OBSTETRICS & GYNECOLOGY

## 2023-09-28 RX ORDER — ATORVASTATIN CALCIUM 10 MG/1
10 TABLET, FILM COATED ORAL DAILY
Qty: 60 TABLET | Refills: 0 | Status: SHIPPED | OUTPATIENT
Start: 2023-09-28 | End: 2023-11-27

## 2023-09-28 NOTE — NURSING NOTE
"Chief Complaint   Patient presents with    Physical       Initial /72   Wt 53.1 kg (117 lb)   BMI 19.77 kg/m   Estimated body mass index is 19.77 kg/m  as calculated from the following:    Height as of 23: 1.638 m (5' 4.5\").    Weight as of this encounter: 53.1 kg (117 lb).  BP completed using cuff size: regular    Questioned patient about current smoking habits.  Pt. has never smoked.          The following HM Due: pap smear      The following patient reported/Care Every where data was sent to:  P ABSTRACT QUALITY INITIATIVES [[id number]]        Karyn Costa CMA                 "

## 2023-09-29 ENCOUNTER — LAB (OUTPATIENT)
Dept: LAB | Facility: CLINIC | Age: 58
End: 2023-09-29
Payer: COMMERCIAL

## 2023-09-29 DIAGNOSIS — E78.00 ELEVATED CHOLESTEROL: ICD-10-CM

## 2023-09-29 LAB
ALBUMIN SERPL BCG-MCNC: 4.7 G/DL (ref 3.5–5.2)
ALP SERPL-CCNC: 74 U/L (ref 35–104)
ALT SERPL W P-5'-P-CCNC: 14 U/L (ref 0–50)
ANION GAP SERPL CALCULATED.3IONS-SCNC: 14 MMOL/L (ref 7–15)
AST SERPL W P-5'-P-CCNC: 24 U/L (ref 0–45)
BILIRUB SERPL-MCNC: 0.3 MG/DL
BUN SERPL-MCNC: 18.9 MG/DL (ref 8–23)
CALCIUM SERPL-MCNC: 9.7 MG/DL (ref 8.6–10)
CHLORIDE SERPL-SCNC: 101 MMOL/L (ref 98–107)
CHOLEST SERPL-MCNC: 240 MG/DL
CREAT SERPL-MCNC: 1.05 MG/DL (ref 0.51–0.95)
DEPRECATED HCO3 PLAS-SCNC: 24 MMOL/L (ref 22–29)
EGFRCR SERPLBLD CKD-EPI 2021: 61 ML/MIN/1.73M2
GLUCOSE SERPL-MCNC: 90 MG/DL (ref 70–99)
HDLC SERPL-MCNC: 75 MG/DL
LDLC SERPL CALC-MCNC: 149 MG/DL
NONHDLC SERPL-MCNC: 165 MG/DL
POTASSIUM SERPL-SCNC: 4.1 MMOL/L (ref 3.4–5.3)
PROT SERPL-MCNC: 7.5 G/DL (ref 6.4–8.3)
SODIUM SERPL-SCNC: 139 MMOL/L (ref 135–145)
TRIGL SERPL-MCNC: 78 MG/DL

## 2023-09-29 PROCEDURE — 36415 COLL VENOUS BLD VENIPUNCTURE: CPT

## 2023-09-29 PROCEDURE — 80053 COMPREHEN METABOLIC PANEL: CPT

## 2023-09-29 PROCEDURE — 80061 LIPID PANEL: CPT

## 2023-09-29 NOTE — PATIENT INSTRUCTIONS
You can reach your [place] Care Team any time of the day by calling [phone]. This number will put you in touch with the 24 hour nurse line if the clinic is closed.    To contact your OB/GYN Surgery Scheduler please call [phone]. This is a direct number for your care team between 8 a.m. and 4 p.m. Monday through Friday.    [facility] is open for your convenience: [phone]  Monday through Friday 8 a.m. to 8:30 p.m.  Saturday 9 a.m. to 6 p.m.  Sunday Noon to 6 p.m.    They are closed on all major holidays.

## 2023-09-29 NOTE — PROGRESS NOTES
HPI:  Joyce Alford is a 58 year old white female  ,menopause for contraception last menstrual period age 49 never been on hormone replacement therapy was had no vaginal bleeding who presents for an annual exam and pap.  She is otherwise doing well.  The patient states its been approximately 8 years since she last had an exam.  Self breast exam,  ACS screening mammogram recs, the use of 81 mg ASA to decrease the risk of heart disease, lipid screening, colon cancer screening recs and Dexa scan recs thoroughly reveiwed.      History reviewed. No pertinent past medical history.  Surgical history of bilateral breast augmentation      Family History   Problem Relation Age of Onset    Multiple myeloma Mother     Skin Cancer Father      Social History     Socioeconomic History    Marital status: Single     Spouse name: Not on file    Number of children: Not on file    Years of education: Not on file    Highest education level: Not on file   Occupational History    Not on file   Tobacco Use    Smoking status: Never    Smokeless tobacco: Never   Vaping Use    Vaping Use: Never used   Substance and Sexual Activity    Alcohol use: Yes     Comment: weekly    Drug use: Not Currently    Sexual activity: Yes   Other Topics Concern    Not on file   Social History Narrative    Not on file     Social Determinants of Health     Financial Resource Strain: Not on file   Food Insecurity: Not on file   Transportation Needs: Not on file   Physical Activity: Not on file   Stress: Not on file   Social Connections: Not on file   Interpersonal Safety: Not on file   Housing Stability: Not on file       Allergies:  Patient has no known allergies.    Current Outpatient Medications   Medication Sig Dispense Refill    atorvastatin (LIPITOR) 10 MG tablet Take 1 tablet (10 mg) by mouth daily 60 tablet 0    oxyBUTYnin ER (DITROPAN XL) 5 MG 24 hr tablet Take 1 tablet (5 mg) by mouth daily 90 tablet 4    sertraline (ZOLOFT) 100 MG tablet Take  2 tablets (200 mg) by mouth daily 180 tablet 4       ROS: ROS: 10 point ROS neg other than the symptoms noted above in the HPI.    EXAM:  Vitals: /72   Wt 53.1 kg (117 lb)   BMI 19.77 kg/m    BMI= Body mass index is 19.77 kg/m .  Constitutional: healthy, alert, and no distress  Head: Normocephalic. No masses, lesions, tenderness or abnormalities  Neck: Neck supple. No adenopathy. Thyroid symmetric, normal size,, Carotids without bruits.  ENT: NEGATIVE for ear, mouth and throat problems  Breast:  breasts symmetric, no dominant or suspicious mass, no skin or nipple changes, no axillary adenopathy, unchanged from previous exam, self exam in taught and encouraged, bilateral implants are present  Cardiovascular: negative, PMI normal. No lifts, heaves, or thrills. RRR. No murmurs, clicks gallops or rub  Respiratory: negative, Percussion normal. Good diaphragmatic excursion. Lungs clear  Gastrointestinal: Abdomen soft, non-tender. BS normal. No masses, organomegaly  Genitourinary: Pelvic Exam:  External Genitalia:     Normal appearance for age, no discharge present, no tenderness present, no inflammatory lesions present, color normal  Vagina:     Normal vaginal vault without central or paravaginal defects, no discharge present, no inflammatory lesions present, no masses present  Bladder:     Nontender to palpation  Urethra:   Urethral Body:  Urethra palpation normal, urethra structural support normal   Urethral Meatus:  No erythema or lesions present  Cervix:     Appearance healthy, no lesions present, nontender to palpation, no bleeding present  Uterus:     Uterus: firm, normal sized and nontender, midplane in position.   Adnexa:     No adnexal tenderness present, no adnexal masses present  Perineum:     Perineum within normal limits, no evidence of trauma, no rashes or skin lesions present  Anus:     Anus within normal limits, no hemorrhoids present  Inguinal Lymph Nodes:     No lymphadenopathy present  Pubic  Hair:     Normal pubic hair distribution for age  Genitalia and Groin:     No rashes present, no lesions present, no areas of discoloration, no masses present  Musculoskeletalextremities normal- no gross deformities noted, gait normal, and normal muscle tone  Integument: no suspicious lesions or rashes  Neurologic: Gait normal. Reflexes normal and symmetric. Sensation grossly WNL.  Psychiatric: mentation appears normal and affect normal/bright  Hematologic/Lymphatic/Immunologic: Normal cervical lymph nodes     ASSESSMENT:/PLAN:  (E78.00) Elevated cholesterol  (primary encounter diagnosis)  Comment:   Recommendations reviewed previous values reviewed I reviewed the pathophysiology of hyperlipidemia and the rationale for testing.  I did place a future order for a lipid panel and comprehensive metabolic panel and an order for Lipitor.  The patient is going to consider this.  Plan: Lipid panel reflex to direct LDL Fasting,         Comprehensive metabolic panel (BMP + Alb, Alk         Phos, ALT, AST, Total. Bili, TP), atorvastatin         (LIPITOR) 10 MG tablet, Lipid panel reflex to         direct LDL Fasting, Comprehensive metabolic         panel (BMP + Alb, Alk Phos, ALT, AST, Total.         Bili, TP)        I gave the patient a detailed written outline of our discussion.  I have asked that she recheck a lipid panel and comprehensive panel and if the comprehensive metabolic panel is normal begin Lipitor 10 mg daily and in 4 to 6 weeks recheck a fasting lipid panel and if appropriate refill Lipitor for a year    (Z01.419) Encounter for gynecological examination without abnormal finding  Comment: Screening testing recommendations reviewed.  Pap smear done  Plan: If testing is normal would recommend seeing her yearly    (Z12.4) Screening for malignant neoplasm of cervix  Comment: Recommendations reviewed  Plan: Pap screen with HPV - recommended age 30 - 65         years        Done      Karl Mtz  M.D.

## 2023-10-02 LAB
BKR LAB AP GYN ADEQUACY: NORMAL
BKR LAB AP GYN INTERPRETATION: NORMAL
BKR LAB AP HPV REFLEX: NORMAL
BKR LAB AP PREVIOUS ABNORMAL: NORMAL
PATH REPORT.COMMENTS IMP SPEC: NORMAL
PATH REPORT.COMMENTS IMP SPEC: NORMAL
PATH REPORT.RELEVANT HX SPEC: NORMAL

## 2023-10-03 ENCOUNTER — TELEPHONE (OUTPATIENT)
Dept: OBGYN | Facility: CLINIC | Age: 58
End: 2023-10-03
Payer: COMMERCIAL

## 2023-10-03 NOTE — RESULT ENCOUNTER NOTE
"Joyce--I reviewed the results of your recent lipid panel showing a mild elevation in total cholesterol and LDL cholesterol.  The good news is is that you had a CT calcium score done several months ago with a score of 0 making your risk for heart disease very low.  When I saw you in the office you did not mention any risk of heart disease in your family.  You are otherwise a thin healthy woman who is not diabetic and does not smoke.  At this point I think following a low-fat low-cholesterol diet and rechecking a fasting lipid panel in a year would be a reasonable plan.  I am including a copy of a low-fat low-cholesterol diet that you can review.  Please let me know if you have any questions    What lifestyle changes can I make to help improve my cholesterol levels?    Exercise regularly.  Exercise can raise HDL cholesterol levels and reduce levels of LDL cholesterol and triglycerides. If you haven't been exercising, try to work up to 30 minutes, 4 to 6 times a week.    Lose weight if you are overweight.  Being overweight can raise your cholesterol levels. Losing weight, even just 5 or 10 pounds, can lower your total cholesterol, LDL cholesterol and triglyceride levels.    Eat a heart-healthy diet.  Eat plenty of fresh fruits and vegetables. Fruits and vegetables are naturally low in fat. Not only do they add flavor and variety to your diet, but they are also the best source of fiber, vitamins and minerals for your body. Aim for 5 cups of fruits and vegetables every day, not counting potatoes, corn and rice. Potatoes, corn and rice count as carbohydrates.     Pick \"good\" fats over \"bad\" fats. Fat is part of a healthy diet, but you need to know the difference between \"bad\" fats and \"good\" fats. \"Bad\" fats, such as saturated and trans fats, are found in foods such as butter; coconut and palm oil; saturated or partially hydrogenated vegetable fats such as shortening and margarine; animal fats in meats; and fats in whole " "milk dairy products. Limit the amount of saturated fat in your diet, and avoid trans fat completely. Unsaturated fat is the \"good\" fat. Most fats in fish, vegetables, grains and tree nuts are unsaturated. Try to eat unsaturated fat in place of saturated fat. For example, you can use olive oil or canola oil in cooking instead of butter.     Use healthier cooking methods. Baking, broiling and roasting are the healthiest ways to prepare meat, poultry and other foods. Trim any outside fat or skin before cooking. Lean cuts can be pan-broiled or stir-fried. Use either a nonstick pan or nonstick cooking spray instead of adding fats such as butter or margarine. When eating out, ask how food is prepared. You can request that your food be baked, broiled or roasted, rather than fried.   Look for other sources of protein. Fish, dry beans, tree nuts, peas and lentils offer protein, nutrients and fiber without the cholesterol and saturated fats that meats have. Consider eating one \"meatless\" meal each week. Try substituting beans for meat in a favorite recipe, such as lasagna or chili. Snack on a handful of almonds or pecans. Soy is also an excellent source of protein. Good examples of soy include soy milk, edamame (green soy beans), tofu and soy protein shakes.     Get more fiber in your diet. Add good sources of fiber to your meals. Examples include fruits, vegetables, whole grains (such as oat bran, whole and rolled oats and barley), legumes (such as beans and peas) and nuts and seeds (such as ground flax seed). In addition to fiber, whole grains supply B-vitamins and important nutrients not found in foods made with white flour.     Eat more fish. Fish are an excellent source of omega-3 fatty acids. Wild-caught oily fish, such as salmon, tuna, mackerel and sardines, are the best sources of omega-3s, but all fish contain some amount of this beneficial fatty acid. Aim for 2 6-oz servings each week.     Karl Mtz M.D.  "

## 2023-10-03 NOTE — TELEPHONE ENCOUNTER
"I left the patient a voicemail today.  I wanted to discuss her recent lipid panel showing some mild elevations in total cholesterol and LDL.  The patient did have a calcium score done in May of this year that was 0 making her risk of heart disease very low.  At this point I recommend the patient follow a heart healthy low-cholesterol low-fat diet.  I am including 1 that can be given to her.  Could you please reach out to the patient to relay this information to her.  Thank you for your help with this    What lifestyle changes can I make to help improve my cholesterol levels?    Exercise regularly.  Exercise can raise HDL cholesterol levels and reduce levels of LDL cholesterol and triglycerides. If you haven't been exercising, try to work up to 30 minutes, 4 to 6 times a week.    Lose weight if you are overweight.  Being overweight can raise your cholesterol levels. Losing weight, even just 5 or 10 pounds, can lower your total cholesterol, LDL cholesterol and triglyceride levels.    Eat a heart-healthy diet.  Eat plenty of fresh fruits and vegetables. Fruits and vegetables are naturally low in fat. Not only do they add flavor and variety to your diet, but they are also the best source of fiber, vitamins and minerals for your body. Aim for 5 cups of fruits and vegetables every day, not counting potatoes, corn and rice. Potatoes, corn and rice count as carbohydrates.     Pick \"good\" fats over \"bad\" fats. Fat is part of a healthy diet, but you need to know the difference between \"bad\" fats and \"good\" fats. \"Bad\" fats, such as saturated and trans fats, are found in foods such as butter; coconut and palm oil; saturated or partially hydrogenated vegetable fats such as shortening and margarine; animal fats in meats; and fats in whole milk dairy products. Limit the amount of saturated fat in your diet, and avoid trans fat completely. Unsaturated fat is the \"good\" fat. Most fats in fish, vegetables, grains and tree nuts are " "unsaturated. Try to eat unsaturated fat in place of saturated fat. For example, you can use olive oil or canola oil in cooking instead of butter.     Use healthier cooking methods. Baking, broiling and roasting are the healthiest ways to prepare meat, poultry and other foods. Trim any outside fat or skin before cooking. Lean cuts can be pan-broiled or stir-fried. Use either a nonstick pan or nonstick cooking spray instead of adding fats such as butter or margarine. When eating out, ask how food is prepared. You can request that your food be baked, broiled or roasted, rather than fried.   Look for other sources of protein. Fish, dry beans, tree nuts, peas and lentils offer protein, nutrients and fiber without the cholesterol and saturated fats that meats have. Consider eating one \"meatless\" meal each week. Try substituting beans for meat in a favorite recipe, such as lasagna or chili. Snack on a handful of almonds or pecans. Soy is also an excellent source of protein. Good examples of soy include soy milk, edamame (green soy beans), tofu and soy protein shakes.     Get more fiber in your diet. Add good sources of fiber to your meals. Examples include fruits, vegetables, whole grains (such as oat bran, whole and rolled oats and barley), legumes (such as beans and peas) and nuts and seeds (such as ground flax seed). In addition to fiber, whole grains supply B-vitamins and important nutrients not found in foods made with white flour.     Eat more fish. Fish are an excellent source of omega-3 fatty acids. Wild-caught oily fish, such as salmon, tuna, mackerel and sardines, are the best sources of omega-3s, but all fish contain some amount of this beneficial fatty acid. Aim for 2 6-oz servings each week.     Karl Mtz M.D.    " no

## 2023-10-05 LAB
HUMAN PAPILLOMA VIRUS 16 DNA: NEGATIVE
HUMAN PAPILLOMA VIRUS 18 DNA: NEGATIVE
HUMAN PAPILLOMA VIRUS FINAL DIAGNOSIS: NORMAL
HUMAN PAPILLOMA VIRUS OTHER HR: NEGATIVE

## 2023-11-22 ENCOUNTER — LAB (OUTPATIENT)
Dept: LAB | Facility: CLINIC | Age: 58
End: 2023-11-22
Payer: COMMERCIAL

## 2023-11-22 DIAGNOSIS — E78.00 ELEVATED CHOLESTEROL: ICD-10-CM

## 2023-11-22 LAB
ALBUMIN SERPL BCG-MCNC: 5 G/DL (ref 3.5–5.2)
ALP SERPL-CCNC: 75 U/L (ref 40–150)
ALT SERPL W P-5'-P-CCNC: 24 U/L (ref 0–50)
ANION GAP SERPL CALCULATED.3IONS-SCNC: 14 MMOL/L (ref 7–15)
AST SERPL W P-5'-P-CCNC: 26 U/L (ref 0–45)
BILIRUB SERPL-MCNC: 0.4 MG/DL
BUN SERPL-MCNC: 14.3 MG/DL (ref 8–23)
CALCIUM SERPL-MCNC: 9.8 MG/DL (ref 8.6–10)
CHLORIDE SERPL-SCNC: 101 MMOL/L (ref 98–107)
CHOLEST SERPL-MCNC: 202 MG/DL
CREAT SERPL-MCNC: 0.91 MG/DL (ref 0.51–0.95)
DEPRECATED HCO3 PLAS-SCNC: 24 MMOL/L (ref 22–29)
EGFRCR SERPLBLD CKD-EPI 2021: 72 ML/MIN/1.73M2
GLUCOSE SERPL-MCNC: 90 MG/DL (ref 70–99)
HDLC SERPL-MCNC: 92 MG/DL
LDLC SERPL CALC-MCNC: 87 MG/DL
NONHDLC SERPL-MCNC: 110 MG/DL
POTASSIUM SERPL-SCNC: 3.8 MMOL/L (ref 3.4–5.3)
PROT SERPL-MCNC: 7.8 G/DL (ref 6.4–8.3)
SODIUM SERPL-SCNC: 139 MMOL/L (ref 135–145)
TRIGL SERPL-MCNC: 115 MG/DL

## 2023-11-22 PROCEDURE — 36415 COLL VENOUS BLD VENIPUNCTURE: CPT

## 2023-11-22 PROCEDURE — 80061 LIPID PANEL: CPT

## 2023-11-22 PROCEDURE — 80053 COMPREHEN METABOLIC PANEL: CPT

## 2023-11-23 NOTE — RESULT ENCOUNTER NOTE
Joyce, all your lipid panel and metabolic panel results are within normal limits.  Please continue with the Lipitor and we can recheck labs in 1 year.  Please let us know if you have any questions  Sincerely  Karl Mtz M.D.

## 2023-11-27 ENCOUNTER — MYC REFILL (OUTPATIENT)
Dept: OBGYN | Facility: CLINIC | Age: 58
End: 2023-11-27
Payer: COMMERCIAL

## 2023-11-27 DIAGNOSIS — E78.00 ELEVATED CHOLESTEROL: ICD-10-CM

## 2023-11-27 RX ORDER — ATORVASTATIN CALCIUM 10 MG/1
10 TABLET, FILM COATED ORAL DAILY
Qty: 90 TABLET | Refills: 0 | Status: SHIPPED | OUTPATIENT
Start: 2023-11-27

## 2023-11-27 RX ORDER — ATORVASTATIN CALCIUM 10 MG/1
10 TABLET, FILM COATED ORAL DAILY
Qty: 60 TABLET | Refills: 0 | Status: SHIPPED | OUTPATIENT
Start: 2023-11-27 | End: 2023-12-08

## 2023-11-27 NOTE — TELEPHONE ENCOUNTER
Routing refill request to provider for review/approval because:  Drug not on the [facility] refill protocol     ROLANDO Guajardo

## 2023-11-27 NOTE — TELEPHONE ENCOUNTER
Adjusted to 90 per patient preference - okay to refill for one year per result note 11/22.    Alondra DYE RN

## 2024-07-23 ENCOUNTER — OFFICE VISIT (OUTPATIENT)
Dept: URGENT CARE | Facility: URGENT CARE | Age: 59
End: 2024-07-23
Payer: COMMERCIAL

## 2024-07-23 VITALS
DIASTOLIC BLOOD PRESSURE: 73 MMHG | SYSTOLIC BLOOD PRESSURE: 123 MMHG | HEART RATE: 68 BPM | OXYGEN SATURATION: 100 % | RESPIRATION RATE: 12 BRPM | TEMPERATURE: 97.6 F

## 2024-07-23 DIAGNOSIS — H91.93 DECREASED HEARING OF BOTH EARS: Primary | ICD-10-CM

## 2024-07-23 DIAGNOSIS — H61.22 IMPACTED CERUMEN OF LEFT EAR: ICD-10-CM

## 2024-07-23 PROCEDURE — 69209 REMOVE IMPACTED EAR WAX UNI: CPT | Mod: LT | Performed by: PHYSICIAN ASSISTANT

## 2024-07-23 NOTE — PROGRESS NOTES
Assessment & Plan     Decreased hearing of both ears    Decreased hearing due to wax in left ear  No abnormal findings of right ear     Impacted cerumen of left ear    PROCEDURE:    Ear was evaluated and found to have impacted wax  Ear irrigation was performed and was was removed  Patient tolerated procedure well  May use OTC debrox drops prn in the future      Impacted earwax is a buildup of the natural wax in the ear. Tiny glands in your ear make substances that combine with dead skin cells to form earwax. Earwax helps protect your ear canal from water, dirt, infection, and injury. Over time, earwax travels from the inner part of your ear canal to the entrance of the canal. Then it falls away naturally. But in some cases, it can t travel to the entrance of the canal. This may be because of a health condition or objects put in the ear. With age, earwax tends to become harder and less fluid.  Placing objects in ear like ear buds or using q-tips can cause wax to build up in the ears.        No follow-ups on file.    Ernesto Bunch, [facility], PA-C  [facility] HospitalSARAH Cook is a 58 year old female who presents to clinic today for the following health issues:  Chief Complaint   Patient presents with    Ear Problem     Patient here with complaint of plugged ears. States she has had them flushed before and may need done again. No other symptoms noted.        HPI    Review of Systems  Constitutional, HEENT, cardiovascular, pulmonary, gi and gu systems are negative, except as otherwise noted.      Objective    /73 (BP Location: Right arm, Patient Position: Sitting, Cuff Size: Adult Regular)   Pulse 68   Temp 97.6  F (36.4  C) (Tympanic)   Resp 12   SpO2 100%   Physical Exam   GENERAL: alert and no distress  EYES: Eyes grossly normal to inspection, PERRL and conjunctivae and sclerae normal  HENT: normal cephalic/atraumatic, right ear: normal: no effusions, no erythema, normal  landmarks, left ear: occluded with wax, nose and mouth without ulcers or lesions, oropharynx clear, and oral mucous membranes moist  NECK: no adenopathy, no asymmetry, masses, or scars  MS: no gross musculoskeletal defects noted, no edema  SKIN: no suspicious lesions or rashes  NEURO: Normal strength and tone, mentation intact and speech normal  PSYCH: mentation appears normal, affect normal/bright    No results found for any visits on 07/23/24.

## 2024-08-29 ENCOUNTER — PATIENT OUTREACH (OUTPATIENT)
Dept: CARE COORDINATION | Facility: CLINIC | Age: 59
End: 2024-08-29
Payer: COMMERCIAL

## 2024-09-12 ENCOUNTER — PATIENT OUTREACH (OUTPATIENT)
Dept: CARE COORDINATION | Facility: CLINIC | Age: 59
End: 2024-09-12
Payer: COMMERCIAL

## 2024-11-09 ENCOUNTER — HEALTH MAINTENANCE LETTER (OUTPATIENT)
Age: 59
End: 2024-11-09

## 2025-03-27 ENCOUNTER — PATIENT OUTREACH (OUTPATIENT)
Dept: CARE COORDINATION | Facility: CLINIC | Age: 60
End: 2025-03-27
Payer: COMMERCIAL

## 2025-04-02 SDOH — HEALTH STABILITY: PHYSICAL HEALTH: ON AVERAGE, HOW MANY MINUTES DO YOU ENGAGE IN EXERCISE AT THIS LEVEL?: 30 MIN

## 2025-04-02 SDOH — HEALTH STABILITY: PHYSICAL HEALTH: ON AVERAGE, HOW MANY DAYS PER WEEK DO YOU ENGAGE IN MODERATE TO STRENUOUS EXERCISE (LIKE A BRISK WALK)?: 6 DAYS

## 2025-04-02 ASSESSMENT — PATIENT HEALTH QUESTIONNAIRE - PHQ9
SUM OF ALL RESPONSES TO PHQ QUESTIONS 1-9: 3
10. IF YOU CHECKED OFF ANY PROBLEMS, HOW DIFFICULT HAVE THESE PROBLEMS MADE IT FOR YOU TO DO YOUR WORK, TAKE CARE OF THINGS AT HOME, OR GET ALONG WITH OTHER PEOPLE: NOT DIFFICULT AT ALL
SUM OF ALL RESPONSES TO PHQ QUESTIONS 1-9: 3

## 2025-04-02 ASSESSMENT — SOCIAL DETERMINANTS OF HEALTH (SDOH): HOW OFTEN DO YOU GET TOGETHER WITH FRIENDS OR RELATIVES?: THREE TIMES A WEEK

## 2025-04-03 ENCOUNTER — OFFICE VISIT (OUTPATIENT)
Dept: INTERNAL MEDICINE | Facility: CLINIC | Age: 60
End: 2025-04-03
Payer: COMMERCIAL

## 2025-04-03 VITALS
HEIGHT: 63 IN | SYSTOLIC BLOOD PRESSURE: 128 MMHG | HEART RATE: 78 BPM | RESPIRATION RATE: 14 BRPM | OXYGEN SATURATION: 99 % | WEIGHT: 120.4 LBS | BODY MASS INDEX: 21.33 KG/M2 | DIASTOLIC BLOOD PRESSURE: 73 MMHG | TEMPERATURE: 97.2 F

## 2025-04-03 DIAGNOSIS — Z13.820 SCREENING FOR OSTEOPOROSIS: ICD-10-CM

## 2025-04-03 DIAGNOSIS — Z13.0 SCREENING, IRON DEFICIENCY ANEMIA: ICD-10-CM

## 2025-04-03 DIAGNOSIS — Z13.29 SCREENING FOR THYROID DISORDER: ICD-10-CM

## 2025-04-03 DIAGNOSIS — Z12.11 SCREEN FOR COLON CANCER: ICD-10-CM

## 2025-04-03 DIAGNOSIS — Z13.220 LIPID SCREENING: ICD-10-CM

## 2025-04-03 DIAGNOSIS — Z00.00 PREVENTATIVE HEALTH CARE: Primary | ICD-10-CM

## 2025-04-03 DIAGNOSIS — F33.1 MAJOR DEPRESSIVE DISORDER, RECURRENT EPISODE, MODERATE (H): ICD-10-CM

## 2025-04-03 LAB
ALBUMIN SERPL BCG-MCNC: 4.9 G/DL (ref 3.5–5.2)
ALP SERPL-CCNC: 79 U/L (ref 40–150)
ALT SERPL W P-5'-P-CCNC: 20 U/L (ref 0–50)
ANION GAP SERPL CALCULATED.3IONS-SCNC: 13 MMOL/L (ref 7–15)
AST SERPL W P-5'-P-CCNC: 24 U/L (ref 0–45)
BILIRUB SERPL-MCNC: 0.3 MG/DL
BUN SERPL-MCNC: 20.1 MG/DL (ref 8–23)
CALCIUM SERPL-MCNC: 9.9 MG/DL (ref 8.8–10.4)
CHLORIDE SERPL-SCNC: 103 MMOL/L (ref 98–107)
CHOLEST SERPL-MCNC: 234 MG/DL
CREAT SERPL-MCNC: 0.83 MG/DL (ref 0.51–0.95)
EGFRCR SERPLBLD CKD-EPI 2021: 80 ML/MIN/1.73M2
ERYTHROCYTE [DISTWIDTH] IN BLOOD BY AUTOMATED COUNT: 12.7 % (ref 10–15)
FASTING STATUS PATIENT QL REPORTED: YES
FASTING STATUS PATIENT QL REPORTED: YES
GLUCOSE SERPL-MCNC: 97 MG/DL (ref 70–99)
HCO3 SERPL-SCNC: 23 MMOL/L (ref 22–29)
HCT VFR BLD AUTO: 40.7 % (ref 35–47)
HDLC SERPL-MCNC: 88 MG/DL
HGB BLD-MCNC: 14 G/DL (ref 11.7–15.7)
LDLC SERPL CALC-MCNC: 125 MG/DL
MCH RBC QN AUTO: 29.5 PG (ref 26.5–33)
MCHC RBC AUTO-ENTMCNC: 34.4 G/DL (ref 31.5–36.5)
MCV RBC AUTO: 86 FL (ref 78–100)
NONHDLC SERPL-MCNC: 146 MG/DL
PLATELET # BLD AUTO: 270 10E3/UL (ref 150–450)
POTASSIUM SERPL-SCNC: 4.2 MMOL/L (ref 3.4–5.3)
PROT SERPL-MCNC: 7.7 G/DL (ref 6.4–8.3)
RBC # BLD AUTO: 4.75 10E6/UL (ref 3.8–5.2)
SODIUM SERPL-SCNC: 139 MMOL/L (ref 135–145)
TRIGL SERPL-MCNC: 107 MG/DL
TSH SERPL DL<=0.005 MIU/L-ACNC: 1.3 UIU/ML (ref 0.3–4.2)
WBC # BLD AUTO: 4.7 10E3/UL (ref 4–11)

## 2025-04-03 ASSESSMENT — PAIN SCALES - GENERAL: PAINLEVEL_OUTOF10: NO PAIN (0)

## 2025-04-03 NOTE — PROGRESS NOTES
Preventive Care Visit  [facility]  Brenden Barajas MD, Internal Medicine  Apr 3, 2025    Assessment & Plan     (Z00.00) Preventative health care  (primary encounter diagnosis)  - Diet and exercise appropriate for age  - No significant alcohol use, denies tobacco  - Retired  - Cancer screening as below  Plan: Comprehensive metabolic panel (BMP + Alb, Alk         Phos, ALT, AST, Total. Bili, TP)    (Z12.11) Screen for colon cancer  - Normal in 2023, declined colonoscopy  Plan: Fecal colorectal cancer screen FIT - Future         (S+30)    (Z13.820) Screening for osteoporosis  - Had tibial stress fracture last several years  - Takes vitamin D supplements  Plan: DX Bone Density    (Z13.29) Screening for thyroid disorder  - Fatigue  Plan: TSH with free T4 reflex    (Z13.220) Lipid screening  - Prior history of hyperlipidemia  - Last ASCVD score 3%  Plan: Lipid panel reflex to direct LDL Fasting    (Z13.0) Screening, iron deficiency anemia  - No bleeding issues reported  Plan: CBC with platelets        Counseling  Appropriate preventive services were addressed with this patient via screening, questionnaire, or discussion as appropriate for fall prevention, nutrition, physical activity, Tobacco-use cessation, social engagement, weight loss and cognition.  Checklist reviewing preventive services available has been given to the patient.  Reviewed patient's diet, addressing concerns and/or questions.   The patient was instructed to see the dentist every 6 months.       Tavares Cook is a 59 year old, presenting for the following:  Physical        4/3/2025     7:12 AM   Additional Questions   Roomed by hope r   Accompanied by self         4/3/2025     7:12 AM   Patient Reported Additional Medications   Patient reports taking the following new medications n/a        Via the Health Maintenance questionnaire, the patient has reported the following services have been completed -Cervical Cancer  Screening: don't remember 2023-05-01, this information has been sent to the abstraction team.    HPI  59F here for annual physical.  Patient denies any acute complaints and is here for general checkup.  Her only concern is regarding possible progression of osteoporosis from prior history of osteopenia.  She did have a stress fracture left tibia several years ago.  She is taking atorvastatin for hyperlipidemia history.  Denies significant alcohol use, no tobacco use.        Advance Care Planning  Patient does not have a Health Care Directive: Patient states has Advance Directive and will bring in a copy to clinic.      4/2/2025   General Health   How would you rate your overall physical health? Good   Feel stress (tense, anxious, or unable to sleep) Not at all         4/2/2025   Nutrition   Three or more servings of calcium each day? Yes   Diet: Regular (no restrictions)   How many servings of fruit and vegetables per day? (!) 0-1   How many sweetened beverages each day? 0-1         4/2/2025   Exercise   Days per week of moderate/strenous exercise 6 days   Average minutes spent exercising at this level 30 min         4/2/2025   Social Factors   Frequency of gathering with friends or relatives Three times a week   Worry food won't last until get money to buy more No   Food not last or not have enough money for food? No   Do you have housing? (Housing is defined as stable permanent housing and does not include staying ouside in a car, in a tent, in an abandoned building, in an overnight shelter, or couch-surfing.) Yes   Are you worried about losing your housing? No   Lack of transportation? No   Unable to get utilities (heat,electricity)? No         4/2/2025   Fall Risk   Fallen 2 or more times in the past year? No   Trouble with walking or balance? No          4/2/2025   Dental   Dentist two times every year? (!) NO         Today's PHQ-9 Score:       4/2/2025     5:07 PM   PHQ-9 SCORE   PHQ-9 Total Score [place] 3  (Minimal depression)   PHQ-9 Total Score 3        Patient-reported         4/2/2025   Substance Use   Alcohol more than 3/day or more than 7/wk No   Do you use any other substances recreationally? No     Social History     Tobacco Use    Smoking status: Never    Smokeless tobacco: Never   Vaping Use    Vaping status: Never Used   Substance Use Topics    Alcohol use: Yes     Comment: weekly    Drug use: Not Currently           7/13/2023   LAST FHS-7 RESULTS   1st degree relative breast or ovarian cancer No   Any relative bilateral breast cancer No   Any male have breast cancer No   Any ONE woman have BOTH breast AND ovarian cancer No   Any woman with breast cancer before 50yrs No   2 or more relatives with breast AND/OR ovarian cancer No   2 or more relatives with breast AND/OR bowel cancer No             4/2/2025   One time HIV Screening   Previous HIV test? No         4/2/2025   STI Screening   New sexual partner(s) since last STI/HIV test? No     History of abnormal Pap smear: No - age 30- 64 PAP with HPV every 5 years recommended        Latest Ref Rng & Units 9/28/2023     3:27 PM   PAP / HPV   PAP  Negative for Intraepithelial Lesion or Malignancy (NILM)    HPV 16 DNA Negative Negative    HPV 18 DNA Negative Negative    Other HR HPV Negative Negative      ASCVD Risk   The 10-year ASCVD risk score (Marty HALLMAN, et al., 2019) is: 2.3%    Values used to calculate the score:      Age: 59 years      Sex: Female      Is Non- : No      Diabetic: No      Tobacco smoker: No      Systolic Blood Pressure: 128 mmHg      Is BP treated: No      HDL Cholesterol: 92 mg/dL      Total Cholesterol: 202 mg/dL      No past medical history on file.  Past Surgical History:   Procedure Laterality Date    DE BREAST AUGMENTATION Bilateral          Review of Systems  Constitutional, neuro, ENT, endocrine, pulmonary, cardiac, gastrointestinal, genitourinary, musculoskeletal, integument and psychiatric systems  "are negative, except as otherwise noted.     Objective    Exam  /73 (BP Location: Left arm, Patient Position: Sitting, Cuff Size: Adult Regular)   Pulse 78   Temp 97.2  F (36.2  C) (Tympanic)   Resp 14   Ht 1.61 m (5' 3.39\")   Wt 54.6 kg (120 lb 6.4 oz)   SpO2 99%   BMI 21.07 kg/m     Estimated body mass index is 21.07 kg/m  as calculated from the following:    Height as of this encounter: 1.61 m (5' 3.39\").    Weight as of this encounter: 54.6 kg (120 lb 6.4 oz).    Physical Exam  Vitals reviewed.   Constitutional:       Appearance: Normal appearance.   HENT:      Head: Atraumatic.   Eyes:      Extraocular Movements: Extraocular movements intact.   Cardiovascular:      Rate and Rhythm: Normal rate and regular rhythm.   Pulmonary:      Breath sounds: Normal breath sounds.   Abdominal:      Palpations: Abdomen is soft.   Musculoskeletal:         General: Normal range of motion.      Cervical back: Normal range of motion.   Skin:     General: Skin is warm.   Neurological:      General: No focal deficit present.   Psychiatric:         Mood and Affect: Mood normal.         Signed Electronically by: Brenden Barajas MD  "

## 2025-05-05 ENCOUNTER — PATIENT OUTREACH (OUTPATIENT)
Dept: GASTROENTEROLOGY | Facility: CLINIC | Age: 60
End: 2025-05-05
Payer: COMMERCIAL

## 2025-05-05 DIAGNOSIS — Z12.11 SPECIAL SCREENING FOR MALIGNANT NEOPLASMS, COLON: Primary | ICD-10-CM

## 2025-05-05 LAB — HEMOCCULT STL QL IA: POSITIVE

## 2025-05-05 NOTE — PROGRESS NOTES
"Patient has had a positive FIT. Screening colonoscopy is being recommended to be completed within 3 months per protocol. Patient notified via [employer].      [facility] Screening Colonoscopy Referral Review    Patient meets the inclusion criteria for screening colonoscopy standing order.    Ordering/Referring Provider:  Brenden Barajas MD    BMI: Estimated body mass index is 21.07 kg/m  as calculated from the following:    Height as of 4/3/25: 1.61 m (5' 3.39\").    Weight as of 4/3/25: 54.6 kg (120 lb 6.4 oz).     Sedation:  Does patient have any of the following conditions affecting sedation?  No medical conditions affecting sedation.    Previous Scopes:  Any previous recommendations or follow up needs based on previous scope?  na / No recommendations.    Medical Concerns to Postpone Order:  Does patient have any of the following medical concerns that should postpone/delay colonoscopy referral?  No medical conditions affecting colonoscopy referral.    Final Referral Details:  Based on patient's medical history patient is appropriate for referral order with moderate sedation. If patient's BMI > 50 do not schedule in ASC.  " RN to triage the need for this medication.    Linda Henao RN, [facility] Triage

## 2025-05-22 ENCOUNTER — ANCILLARY PROCEDURE (OUTPATIENT)
Dept: BONE DENSITY | Facility: CLINIC | Age: 60
End: 2025-05-22
Attending: STUDENT IN AN ORGANIZED HEALTH CARE EDUCATION/TRAINING PROGRAM
Payer: COMMERCIAL

## 2025-05-22 DIAGNOSIS — Z13.820 SCREENING FOR OSTEOPOROSIS: ICD-10-CM

## 2025-05-22 PROCEDURE — 77080 DXA BONE DENSITY AXIAL: CPT | Mod: TC | Performed by: INTERNAL MEDICINE

## 2025-05-28 ENCOUNTER — RESULTS FOLLOW-UP (OUTPATIENT)
Dept: INTERNAL MEDICINE | Facility: CLINIC | Age: 60
End: 2025-05-28

## 2025-08-09 ENCOUNTER — LAB (OUTPATIENT)
Dept: LAB | Facility: CLINIC | Age: 60
End: 2025-08-09
Payer: COMMERCIAL

## 2025-08-09 DIAGNOSIS — Z13.220 LIPID SCREENING: ICD-10-CM

## 2025-08-09 LAB
CHOLEST SERPL-MCNC: 237 MG/DL
FASTING STATUS PATIENT QL REPORTED: YES
HDLC SERPL-MCNC: 83 MG/DL
LDLC SERPL CALC-MCNC: 139 MG/DL
NONHDLC SERPL-MCNC: 154 MG/DL
TRIGL SERPL-MCNC: 75 MG/DL

## 2025-08-09 PROCEDURE — 80061 LIPID PANEL: CPT

## 2025-08-09 PROCEDURE — 36415 COLL VENOUS BLD VENIPUNCTURE: CPT
